# Patient Record
Sex: MALE | Race: WHITE | NOT HISPANIC OR LATINO | Employment: FULL TIME | URBAN - METROPOLITAN AREA
[De-identification: names, ages, dates, MRNs, and addresses within clinical notes are randomized per-mention and may not be internally consistent; named-entity substitution may affect disease eponyms.]

---

## 2024-01-23 ENCOUNTER — OFFICE VISIT (OUTPATIENT)
Dept: OTOLARYNGOLOGY | Facility: CLINIC | Age: 39
End: 2024-01-23
Payer: COMMERCIAL

## 2024-01-23 VITALS — TEMPERATURE: 96.5 F | BODY MASS INDEX: 25.52 KG/M2 | WEIGHT: 183 LBS

## 2024-01-23 DIAGNOSIS — H90.42 SENSORINEURAL HEARING LOSS (SNHL) OF LEFT EAR WITH UNRESTRICTED HEARING OF RIGHT EAR: ICD-10-CM

## 2024-01-23 DIAGNOSIS — H93.12 TINNITUS OF LEFT EAR: ICD-10-CM

## 2024-01-23 DIAGNOSIS — D33.3 ACOUSTIC NEUROMA (MULTI): Primary | ICD-10-CM

## 2024-01-23 PROCEDURE — 99213 OFFICE O/P EST LOW 20 MIN: CPT | Performed by: OTOLARYNGOLOGY

## 2024-01-23 PROCEDURE — 1036F TOBACCO NON-USER: CPT | Performed by: OTOLARYNGOLOGY

## 2024-01-23 ASSESSMENT — PATIENT HEALTH QUESTIONNAIRE - PHQ9
SUM OF ALL RESPONSES TO PHQ9 QUESTIONS 1 AND 2: 0
2. FEELING DOWN, DEPRESSED OR HOPELESS: NOT AT ALL
1. LITTLE INTEREST OR PLEASURE IN DOING THINGS: NOT AT ALL

## 2024-01-23 NOTE — PATIENT INSTRUCTIONS
Welcome to Dr. Caldwell's clinic. We are here to assist you through your ENT care at [facility].  Dr. Caldwell is an Ear surgeon. This means that she specializes in taking care of patients with complex ear problems.     Dr. Caldwell's office number is [phone]. While you may see her at a satellite office, she has a team committed to help meet your healthcare needs at [facility]. This number is the most direct way to communicate with the office.     Mayelin is Dr. Caldwell's  and she answers the office phone from 8am-4pm Mon-Fri. She can help you with many general questions and information. Questions that she cannot answer will be directed to the appropriate staff. You may need to leave a message. In this case, someone from the team will call you back.     Kirk is Dr. Caldwell's primary nurse and can be reached by calling the office. Kirk is in clinic with Dr. Caldwell's on Mondays and Tuesdays. Non-urgent calls will be returned on non-clinic days typically Thursdays.     Sometimes, other team members will also be involved in your care. These people may include dieticians, social workers, speech therapists, audiologist, neurologist, and physical therapist. Dr. Caldwell will provide these referrals as needed. Please let her know if you would like to request a specific referral.     For your convenience, Dr. Caldwell sees patients at several [facility] locations including [facility] and [facility] at the main campus of [facility]. While we try to make your appointments as convenient as possible, occasionally a visit to another location may be necessary to provide the best care for you. We look forward to working with you to meet your healthcare goals.     Dr. Caldwell makes every effort to run on time for your appointments. Therefore, if you are more than 30 minutes late unrelated to a scan or another appointment such therapy or audio you will have to reschedule.

## 2024-01-23 NOTE — LETTER
"January 24, 2024     Emanuel Lawson MD  [address] [facility] [address]    Patient: Wisam Soto \"Jass\"   YOB: 1985   Date of Visit: 1/23/2024       Dear Dr. Emanuel Lawson MD:    I had the pleasure of seeing your patient, Jass Soto today. Below are my notes for this consultation.  If you have questions, please do not hesitate to call me. Thank you for allowing me to participate in the care of your patient.        Sincerely,     Olimpia Caldwell MD      CC: Milton Almazan MD  _____________________________________________________________________________    38 y.o. male with a history of left acoustic neuroma, asymmetric SNHL, and left tinnitus. 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 He has associated left-sided asymmetric sensorineural hearing loss but still has excellent speech understanding as of June 2023 (100%). We again briefly reviewed treatment options of observation, radiosurgery, and surgical resection, though this discussion was limited without updated audiometric and imaging data to provide information on interval tumor growth. The will obtain his MRI and audiogram and send these to us. We will schedule a virtual visit to review these results and arrange follow-up accordingly.         "

## 2024-01-23 NOTE — PROGRESS NOTES
History Of Present Illness:  Wisam Soto is a 38 y.o. male with a history of left acoustic neuroma, asymmetric SNHL, and left tinnitus. Since his last visit, his hearing has remained stable. His intermittent episodes of lightheadedness did increase in frequency towards the end of the year; he is unsure why but started tracking it to try to identify triggers, and his symptoms have since improved to once every 2 weeks (his baseline). He denies any facial weakness or twitching. He has not had an updated audiogram or MRI yet.    Recall 7/18/23:   Wisam is a 38 year old male referred here today by Dr. Almazan for a left-sided acoustic neuroma. Patient reports progressive hearing loss left worse than right, as well as associated tinnitus. About 6 months ago, reports sudden drop in hearing loss on his left as well as worsening his tinnitus. He obtained an MRI which showed the left IAC lesion approximately 8 mm in size, consistent with acoustic neuroma. Reports that he has some mild dizziness with standing up that resolves on its own, but this also occurs while he is sitting down and driving. This does not keep him from his daily activities. He denies any previous otologic history of disease, surgery, trauma.    Surgical History:  He has no past surgical history on file.     Allergies:  Patient has no known allergies.    Medications:   No current outpatient medications     Review of Systems:   A comprehensive 10-point review of systems was obtained including constitutional, neurological, HEENT, pulmonary, cardiovascular, genito-urinary, and other pertinent systems and was negative except as noted in the HPI.      Physical Exam:  Constitutional   General appearance: Healthy-appearing, well-nourished, well groomed, in no acute distress.   Ability to communicate: Normal communication without aids, normal voice quality.       Head and face: Atraumatic with no masses, lesions, or scarring.   Facial strength: Normal strength  and symmetry, no synkinesis or facial tic.     Ears  Otoscopic examination: Bilateral normal otoscopy of the tympanic membrane     Nose: Dorsum symmetric with no visible or palpable deformities.     Oral Cavity/Mouth  Lips, teeth, and gums: Normal lips, gums, and dentition.     Oropharynx: Mucosa moist, no lesions.     Neck: Symmetrical, trachea midline. No masses visible.     Neurological/Psychiatric  Cranial Nerve Examination: II - XII grossly intact.  Orientation to person, place, and time: Normal.  Mood and affect: Normal.     Skin: Normal without rashes or lesions.     Pulmonary  Respiratory effort: Chest expands symmetrically.     Cardiovascular: Good peripheral pulses  Peripheral vascular system: No varicosities, carotid pulse normal, no edema. No jugular venous distension.     Extremities: Appearance of extremities: Normal. Gait normal.     Procedure:  None    Last Recorded Vitals:  Temperature 35.8 °C (96.5 °F), weight 83 kg (183 lb).    Relevant Results:  No new results for review. Prior audiogram from 06/16/2023 reviewed, which showed normal hearing on the right and 100% WRS. On the left, there is normal sloping to a notched mild-to-moderate mid-to-high frequency sensorineural hearing loss with 100% WRS.     Assessment/Plan   38 y.o. male with a history of left acoustic neuroma, asymmetric SNHL, and left tinnitus. 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 He has associated left-sided asymmetric sensorineural hearing loss but still has excellent speech understanding as of June 2023 (100%). We again briefly reviewed treatment options of observation, radiosurgery, and surgical resection, though this discussion was limited without updated audiometric and imaging data to provide information on interval tumor growth. The will obtain his MRI and audiogram and send these to us. We will schedule a virtual visit to review these results and arrange  follow-up accordingly.     I saw and evaluated the patient. I personally obtained the key and critical portions of the history and physical exam or was physically present for key and critical portions performed by the resident/fellow. I reviewed the resident/fellow's documentation and discussed the patient with the resident/fellow. I agree with the resident/fellow's medical decision making as documented in the note.    Olimpia Caldwell MD